# Patient Record
Sex: FEMALE | Race: WHITE | URBAN - METROPOLITAN AREA
[De-identification: names, ages, dates, MRNs, and addresses within clinical notes are randomized per-mention and may not be internally consistent; named-entity substitution may affect disease eponyms.]

---

## 2017-03-27 ENCOUNTER — PRE VISIT (OUTPATIENT)
Dept: ANESTHESIOLOGY | Facility: CLINIC | Age: 67
End: 2017-03-27

## 2017-03-27 NOTE — TELEPHONE ENCOUNTER
1.  Date/reason for appt:  4/04/17   Neurofibromatosis/Ear Pain    2.  Referring provider:  Dr Grey    3.  Call to patient (Yes / No - short description):  No, established referred pt.  Records reviewed.  All records are in Psychiatric and imaging is in PACS.

## 2017-11-16 DIAGNOSIS — H92.01 OTALGIA OF RIGHT EAR: ICD-10-CM

## 2017-11-16 RX ORDER — GABAPENTIN 800 MG/1
800 TABLET ORAL 4 TIMES DAILY
Qty: 120 TABLET | Refills: 0 | Status: SHIPPED | OUTPATIENT
Start: 2017-11-16 | End: 2017-12-27

## 2017-12-27 DIAGNOSIS — H92.01 OTALGIA OF RIGHT EAR: ICD-10-CM

## 2017-12-27 RX ORDER — GABAPENTIN 800 MG/1
800 TABLET ORAL 4 TIMES DAILY
Qty: 120 TABLET | Refills: 1 | Status: SHIPPED | OUTPATIENT
Start: 2017-12-27 | End: 2018-01-11

## 2018-01-11 ENCOUNTER — OFFICE VISIT (OUTPATIENT)
Dept: NEUROLOGY | Facility: CLINIC | Age: 68
End: 2018-01-11
Payer: COMMERCIAL

## 2018-01-11 VITALS
DIASTOLIC BLOOD PRESSURE: 76 MMHG | WEIGHT: 161 LBS | HEART RATE: 67 BPM | SYSTOLIC BLOOD PRESSURE: 170 MMHG | BODY MASS INDEX: 31.61 KG/M2 | HEIGHT: 60 IN

## 2018-01-11 DIAGNOSIS — H93.13 TINNITUS, BILATERAL: Primary | ICD-10-CM

## 2018-01-11 DIAGNOSIS — H92.01 OTALGIA OF RIGHT EAR: ICD-10-CM

## 2018-01-11 RX ORDER — GABAPENTIN 800 MG/1
800 TABLET ORAL 4 TIMES DAILY
Qty: 360 TABLET | Refills: 3 | Status: SHIPPED | OUTPATIENT
Start: 2018-01-11

## 2018-01-11 ASSESSMENT — ENCOUNTER SYMPTOMS
CONSTIPATION: 0
MUSCLE CRAMPS: 0
HOARSE VOICE: 0
TREMORS: 0
SINUS CONGESTION: 1
SINUS PAIN: 0
DIARRHEA: 1
NUMBNESS: 0
LOSS OF CONSCIOUSNESS: 0
EYE IRRITATION: 0
SORE THROAT: 0
NECK PAIN: 0
BACK PAIN: 0
JOINT SWELLING: 0
SEIZURES: 0
EYE WATERING: 0
RECTAL PAIN: 0
JAUNDICE: 0
TASTE DISTURBANCE: 0
NECK MASS: 0
EYE PAIN: 0
HEARTBURN: 1
DISTURBANCES IN COORDINATION: 0
EYE REDNESS: 0
TINGLING: 0
WEAKNESS: 0
TROUBLE SWALLOWING: 0
DIZZINESS: 1
MEMORY LOSS: 0
MUSCLE WEAKNESS: 0
ARTHRALGIAS: 1
BLOATING: 0
NAUSEA: 0
BOWEL INCONTINENCE: 0
ABDOMINAL PAIN: 0
DOUBLE VISION: 0
HEADACHES: 0
PARALYSIS: 0
STIFFNESS: 0
MYALGIAS: 0
VOMITING: 0
SMELL DISTURBANCE: 0
SPEECH CHANGE: 0
BLOOD IN STOOL: 0

## 2018-01-11 NOTE — LETTER
Date:January 18, 2018      Patient was self referred, no letter generated. Do not send.        [facility] Health Information

## 2018-01-11 NOTE — LETTER
2018       RE: Pat Guajardo  [address] [address] [place] [id number]     Dear Colleague,    Thank you for referring your patient, Pat Guajardo, to the [facility] NEUROLOG[facility] at [facility]. Please see a copy of my visit note below.    2018      Delfina Robbins MD   [facility]   [address] [place] [address]       RE: Pat Guajardo   MRN: [id number]   : 1950      Dear Delfina:      This is in regard to followup on Pat Guajardo.  The patient returned today routinely on 2018.  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      The patient was seen by Dr. Hankins, our neuro-otologist.  He referred her to our expert, Dr. Grey in the Neurofibromatosis Clinic in 2016.  The patient had had 4 lesions removed over the years.  It was recommended that the patient undergo genetic testing, but she decided to forego it because of the cost.  She said her 2 children who are 45 and 49 show no signs of neurofibromatosis.  She does not feel that it is going to change her clinical course, but she is aware that she needs to go back to Dr. 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 Altamirano.  The patient denied any other problems.  She said that her helix pain does seem to be under good control if she takes gabapentin.  Specifically, she has had no trouble on the medication.  She denied any daytime sedation or trouble driving.  She is not depressed.  She has not had weight gain nor any edema.  She did wonder about possibly reducing dosage.  I went over with her previously prescribed medication dosage and did give her a schedule of reducing it by 400 mg every few weeks.  She told me that since I last saw her she has been taken off atenolol.  She currently is on lisinopril and hydrochlorothiazide.  She has had tinnitus in both ears.  She was due to have followup with Dr. Hankins and I did  remind her of it.  She denied any other issues.  She said she does not feel that she has a need to go back to the Neurofibromatosis Clinic here at the [facility].  She had reviewed her family history with them including the possibility that her mother had neurofibromas.  She was told that there was a 50% chance of an individual to inherent NF1 from a parent.  She was told if a parent also has NF1, there is a 50% chance with each pregnancy that they will have a child who also has NF1 and a 50% chance that they will have a child who does not have NF1.        Neurologic examination revealed a pleasant woman.  Her blood pressure was 170/76 with a pulse of 67 with a machine when she came into the office.  I rechecked it with a soft cuff and it was 130/78 with a pulse of 72.  Otherwise, she did tell me that she feels possibly her gait is not as good as it had been, but she had normal gait, station, cerebellar testing, muscle stretch reflexes, plantar stimulation, strength, careful cranial nerve examination and cortical sensory testing.  She did not have cervical bruits.  She had a regular cardiac rhythm without gallops or murmurs.      The patient did tell me that earlier she had been treated with lipoflavonoids and is going to go back on that supplement.  She said it was helpful to her in the past.      I made arrangements for the patient to have followup with Dr. Hankins, our neuro-otologist, regarding her complaints including that of tinnitus.      I refilled her gabapentin.      Thank you for allowing me to see this patient.      Sincerely yours,      Michel Acuna MD      I spent 30 minutes with the patient today.  Over 50% of the time this involved counseling and coordination of care.         MICHEL ACUNA MD             D: 2018 12:36   T: 01/15/2018 09:36   MT: AKA      Name:     NIGEL LYNNE   MRN:      [id number]        Account:      [id number]   :      1950           Service Date:  01/11/2018      Document: [id number]       Again, thank you for allowing me to participate in the care of your patient.      Sincerely,    Atif Acuna MD

## 2018-01-11 NOTE — MR AVS SNAPSHOT
After Visit Summary   1/11/2018    Pat Guajardo    MRN: [id number]           Patient Information     Date Of Birth          1950        Visit Information        Provider Department      1/11/2018 9:00 AM Atif Acuna MD [facility] Neurology        Today's Diagnoses     Tinnitus, bilateral    -  1    Otalgia of right ear           Follow-ups after your visit        Additional Services     OTOLARYNGOLOGY REFERRAL       Your provider has referred you to: [facility]: Adult Ear, Nose and Throat Clinic (Otolaryngology) [facility] [phone]  [email or web address]    Please be aware that coverage of these services is subject to the terms and limitations of your health insurance plan.  Call member services at your health plan with any benefit or coverage questions.      Please bring the following with you to your appointment:    (1) Any X-Rays, CTs or MRIs which have been performed.  Contact the facility where they were done to arrange for  prior to your scheduled appointment.   (2) List of current medications  (3) This referral request   (4) Any documents/labs given to you for this referral                  Who to contact     Please call your clinic at [phone] to:    Ask questions about your health    Make or cancel appointments    Discuss your medicines    Learn about your test results    Speak to your doctor   If you have compliments or concerns about an experience at your clinic, or if you wish to file a complaint, please contact [facility] Patient Relations at [phone] or email us at [email or web address]         Additional Information About Your Visit        MyChart Information     [employer] is an electronic gateway that provides easy, online access to your medical records. With [employer], you can request a clinic appointment, read your test results, renew a prescription or communicate  with your care team.     To sign up for [employer] visit the website at [email or web address]   You will be asked to enter the access code listed below, as well as some personal information. Please follow the directions to create your username and password.     Your access code is: [email or web address]  Expires: 2018  6:30 AM     Your access code will  in 90 days. If you need help or a new code, please contact your [facility] or call [phone] for assistance.        Care EveryWhere ID     This is your Care EveryWhere ID. This could be used by other organizations to access your [place] medical records  [id number]        Your Vitals Were     Pulse Height BMI (Body Mass Index)             67 1.524 m (5') 31.44 kg/m2          Blood Pressure from Last 3 Encounters:   18 170/76   16 171/81   07/07/15 110/76    Weight from Last 3 Encounters:   18 73 kg (161 lb)   16 73.3 kg (161 lb 9.6 oz)   04/30/15 68 kg (150 lb)              We Performed the Following     OTOLARYNGOLOGY REFERRAL          Where to get your medicines      These medications were sent to [facility]  [facility]   [facility] [address]     Phone:  [phone]     gabapentin 800 MG tablet          Primary Care Provider Fax #    Physician No Ref-Primary [phone]       No address on file        Equal Access to Services     MICHAEL TRIVEDI : Hadii roberth ku hadasho Soomaali, waaxda luqadaha, qaybta kaalmada adeegyada, waxay charlotte hightower. So [facility] [phone].    ATENCIÓN: Si habla español, tiene a candelaria disposición servicios gratuitos de asistencia lingüística. Llame al [phone].    We comply with applicable federal civil rights laws and [place] laws. We do not discriminate on the basis of race, color, national origin, age, disability, sex, sexual orientation, or gender identity.            Thank you!     Thank you for choosing [facility]UROLOG[facility]  for your care. Our goal is always to provide you with excellent care. Hearing back from our patients is one way we can continue to improve our services. Please take a few minutes to complete the written survey that you may receive in the mail after your visit with us. Thank you!             Your Updated Medication List - Protect others around you: Learn how to safely use, store and throw away your medicines at www.disposemymeds.org.          This list is accurate as of: 1/11/18 10:19 AM.  Always use your most recent med list.                   Brand Name Dispense Instructions for use Diagnosis    ASPIRIN PO      Take 81 mg by mouth daily        ATENOLOL PO      Take 12.5 mg by mouth daily        ferrous sulfate 325 (65 FE) MG tablet    IRON     Take 325 mg by mouth daily (with breakfast)        gabapentin 800 MG tablet    NEURONTIN    360 tablet    Take 1 tablet (800 mg) by mouth 4 times daily    Otalgia of right ear       HYDROCHLOROTHIAZIDE PO      Take 25 mg by mouth daily        HYDROcodone-acetaminophen 5-325 MG per tablet    NORCO     Take 1 tablet by mouth every 6 hours as needed for moderate to severe pain        LISINOPRIL PO      Take 5 mg by mouth daily        OMEGA-3 FISH OIL PO      Take 300 mg by mouth daily        VITAMIN C PO      Take 500 mg by mouth daily        VITAMIN D3 PO      Take 5,000 Units by mouth daily        XANAX PO      Take 0.5 mg by mouth At Bedtime

## 2018-01-15 NOTE — PROGRESS NOTES
2018      Delfina Robbins MD   [facility]   [address] [place] [address]       RE: Pat Guajardo   MRN: [id number]   : 1950      Dear Delfina:      This is in regard to followup on Pat Guajardo.  The patient returned today routinely on 2018.  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      The patient was seen by Dr. Hankins, our neuro-otologist.  He referred her to our expert, Dr. Grey in the Neurofibromatosis Clinic in 2016.  The patient had had 4 lesions removed over the years.  It was recommended that the patient undergo genetic testing, but she decided to forego it because of the cost.  She said her 2 children who are 45 and 49 show no signs of neurofibromatosis.  She does not feel that it is going to change her clinical course, but she is aware that she needs to go back to Dr. 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 Altamirano.  The patient denied any other problems.  She said that her helix pain does seem to be under good control if she takes gabapentin.  Specifically, she has had no trouble on the medication.  She denied any daytime sedation or trouble driving.  She is not depressed.  She has not had weight gain nor any edema.  She did wonder about possibly reducing dosage.  I went over with her previously prescribed medication dosage and did give her a schedule of reducing it by 400 mg every few weeks.  She told me that since I last saw her she has been taken off atenolol.  She currently is on lisinopril and hydrochlorothiazide.  She has had tinnitus in both ears.  She was due to have followup with Dr. Hankins and I did remind her of it.  She denied any other issues.  She said she does not feel that she has a need to go back to the Neurofibromatosis Clinic here at the [facility].  She had reviewed her family history with them including the possibility that her mother had neurofibromas.  She was told  that there was a 50% chance of an individual to inherent NF1 from a parent.  She was told if a parent also has NF1, there is a 50% chance with each pregnancy that they will have a child who also has NF1 and a 50% chance that they will have a child who does not have NF1.        Neurologic examination revealed a pleasant woman.  Her blood pressure was 170/76 with a pulse of 67 with a machine when she came into the office.  I rechecked it with a soft cuff and it was 130/78 with a pulse of 72.  Otherwise, she did tell me that she feels possibly her gait is not as good as it had been, but she had normal gait, station, cerebellar testing, muscle stretch reflexes, plantar stimulation, strength, careful cranial nerve examination and cortical sensory testing.  She did not have cervical bruits.  She had a regular cardiac rhythm without gallops or murmurs.      The patient did tell me that earlier she had been treated with lipoflavonoids and is going to go back on that supplement.  She said it was helpful to her in the past.      I made arrangements for the patient to have followup with Dr. Hankins, our neuro-otologist, regarding her complaints including that of tinnitus.      I refilled her gabapentin.      Thank you for allowing me to see this patient.      Sincerely yours,      Michel Acuna MD      I spent 30 minutes with the patient today.  Over 50% of the time this involved counseling and coordination of care.         MICHEL ACUNA MD             D: 2018 12:36   T: 01/15/2018 09:36   MT: AKA      Name:     NIGEL LYNNE   MRN:      [id number]        Account:      [id number]   :      1950           Service Date: 2018      Document: [id number]

## 2018-01-22 ENCOUNTER — CARE COORDINATION (OUTPATIENT)
Dept: NEUROLOGY | Facility: CLINIC | Age: 68
End: 2018-01-22

## 2018-01-22 NOTE — PROGRESS NOTES
[facility]-Neurosci--Adult-[facility] Cc: Bartolo Bravo, RN; Angelique Aaron, RN        Phone Number: [phone]                     Call from PT as she saw Dr. Acuna on 1/11/18 and he noted that he refilled her Rx for gabapentin (NEURONTIN) 800 MG tablet.  She went to the pharmacy today and they do not have it there.  Please send refill to pharmacy on record and call PT at [phone] or [phone] with a status update.     Thank You,   Sarmad      1/22/18:  Called patient's pharmacy.  They will be filling her Gabapentin tomorrow.  I called Pat and informed her of this.  She will  her medication tomorrow.  She appreciated the call.

## 2021-05-29 ENCOUNTER — RECORDS - HEALTHEAST (OUTPATIENT)
Dept: ADMINISTRATIVE | Facility: CLINIC | Age: 71
End: 2021-05-29